# Patient Record
Sex: FEMALE | Race: ASIAN | Employment: FULL TIME | URBAN - METROPOLITAN AREA
[De-identification: names, ages, dates, MRNs, and addresses within clinical notes are randomized per-mention and may not be internally consistent; named-entity substitution may affect disease eponyms.]

---

## 2017-07-05 ENCOUNTER — HOSPITAL ENCOUNTER (OUTPATIENT)
Age: 37
Discharge: HOME | End: 2017-07-05
Attending: EMERGENCY MEDICINE
Payer: COMMERCIAL

## 2017-07-05 VITALS
HEART RATE: 85 BPM | SYSTOLIC BLOOD PRESSURE: 129 MMHG | OXYGEN SATURATION: 100 % | TEMPERATURE: 99 F | RESPIRATION RATE: 16 BRPM | DIASTOLIC BLOOD PRESSURE: 80 MMHG | WEIGHT: 170 LBS

## 2017-07-05 DIAGNOSIS — S39.012A BACK STRAIN, INITIAL ENCOUNTER: Primary | ICD-10-CM

## 2017-07-05 PROCEDURE — 99203 OFFICE O/P NEW LOW 30 MIN: CPT

## 2017-07-05 PROCEDURE — 99202 OFFICE O/P NEW SF 15 MIN: CPT

## 2017-07-05 RX ORDER — IBUPROFEN 600 MG/1
600 TABLET ORAL EVERY 6 HOURS PRN
Qty: 30 TABLET | Refills: 0 | Status: SHIPPED | OUTPATIENT
Start: 2017-07-05 | End: 2017-07-12

## 2017-07-05 RX ORDER — DIAZEPAM 5 MG/1
5 TABLET ORAL EVERY 6 HOURS PRN
Qty: 20 TABLET | Refills: 0 | Status: SHIPPED | OUTPATIENT
Start: 2017-07-05 | End: 2017-07-12

## 2017-07-05 NOTE — ED NOTES
Leaving for cruise in [facility] prescriptions given and reviewed avoid lifting pushing lifting. Not breast feeding. Follow up with cruise md if not better in 3 days. Or go to the ed. for new or worse concners

## 2017-07-06 NOTE — ED PROVIDER NOTES
Patient Seen in: [facility] [place]    History   No chief complaint on file. Stated Complaint: back pain    HPI    Several days of left low back pain after sitting on a bus for several hours and holding her two year old.  No r extremity strength 5/5. No sensory deficit. Patellar reflexes strong and equal.    Skin: Skin is warm and dry. Psychiatric: She has a normal mood and affect.  Her behavior is normal.            ED Course   Labs Reviewed - No data to display    ===========

## 2022-08-02 ENCOUNTER — HOSPITAL ENCOUNTER (OUTPATIENT)
Age: 42
Discharge: HOME | End: 2022-08-02
Payer: COMMERCIAL

## 2022-08-02 VITALS
BODY MASS INDEX: 27.6 KG/M2 | RESPIRATION RATE: 18 BRPM | SYSTOLIC BLOOD PRESSURE: 131 MMHG | OXYGEN SATURATION: 99 % | HEIGHT: 62 IN | WEIGHT: 150 LBS | HEART RATE: 79 BPM | TEMPERATURE: 98 F | DIASTOLIC BLOOD PRESSURE: 79 MMHG

## 2022-08-02 DIAGNOSIS — Z51.89 VISIT FOR WOUND CHECK: Primary | ICD-10-CM

## 2022-08-02 PROBLEM — O09.90 SUPERVISION OF HIGH-RISK PREGNANCY: Status: ACTIVE | Noted: 2019-02-08

## 2022-08-02 PROBLEM — O34.219 PREVIOUS CESAREAN DELIVERY, ANTEPARTUM CONDITION OR COMPLICATION (HCC): Status: ACTIVE | Noted: 2019-02-08

## 2022-08-02 PROBLEM — O24.119: Status: ACTIVE | Noted: 2019-02-08

## 2022-08-02 PROBLEM — O34.219 PREVIOUS CESAREAN DELIVERY, ANTEPARTUM CONDITION OR COMPLICATION: Status: ACTIVE | Noted: 2019-02-08

## 2022-08-02 PROBLEM — O40.3XX0 POLYHYDRAMNIOS IN THIRD TRIMESTER (HCC): Status: ACTIVE | Noted: 2019-07-19

## 2022-08-02 PROBLEM — O09.90 SUPERVISION OF HIGH-RISK PREGNANCY (HCC): Status: ACTIVE | Noted: 2019-02-08

## 2022-08-02 PROBLEM — O40.3XX0 POLYHYDRAMNIOS IN THIRD TRIMESTER: Status: ACTIVE | Noted: 2019-07-19

## 2022-08-02 PROBLEM — O24.119 PRE-EXISTING TYPE 2 DIABETES MELLITUS DURING PREGNANCY: Status: ACTIVE | Noted: 2019-02-08

## 2022-08-02 PROBLEM — IMO0001 IDDM (INSULIN DEPENDENT DIABETES MELLITUS): Status: ACTIVE | Noted: 2019-01-22

## 2022-08-02 PROBLEM — M75.42 IMPINGEMENT SYNDROME OF LEFT SHOULDER: Status: ACTIVE | Noted: 2020-04-28

## 2022-08-02 PROCEDURE — 99202 OFFICE O/P NEW SF 15 MIN: CPT | Performed by: NURSE PRACTITIONER

## 2022-08-02 RX ORDER — ATORVASTATIN CALCIUM 20 MG/1
20 TABLET, FILM COATED ORAL DAILY
COMMUNITY
Start: 2021-12-27 | End: 2022-12-27

## 2022-08-02 RX ORDER — DIAZEPAM 5 MG/1
1 TABLET ORAL NIGHTLY PRN
COMMUNITY
Start: 2020-04-28

## 2022-08-02 NOTE — ED INITIAL ASSESSMENT (HPI)
Pt presents to the IC with c/o left eyebrow laceration that happened last Wednesday in [facility] while swimming in pool. Bumped eyebrow + bruising, denies hitting head or LOC. Butterfly bandage in place. Pt denies any signs of infection.

## 2023-03-25 ENCOUNTER — HOSPITAL ENCOUNTER (OUTPATIENT)
Age: 43
Discharge: HOME | End: 2023-03-25
Payer: COMMERCIAL

## 2023-03-25 VITALS
OXYGEN SATURATION: 100 % | TEMPERATURE: 98 F | HEART RATE: 90 BPM | DIASTOLIC BLOOD PRESSURE: 80 MMHG | RESPIRATION RATE: 18 BRPM | SYSTOLIC BLOOD PRESSURE: 125 MMHG

## 2023-03-25 DIAGNOSIS — M53.80 BACK TIGHTNESS: Primary | ICD-10-CM

## 2023-03-25 PROCEDURE — 99285 EMERGENCY DEPT VISIT HI MDM: CPT

## 2023-03-25 PROCEDURE — 96374 THER/PROPH/DIAG INJ IV PUSH: CPT

## 2023-03-25 PROCEDURE — 93010 ELECTROCARDIOGRAM REPORT: CPT

## 2023-03-25 PROCEDURE — 93005 ELECTROCARDIOGRAM TRACING: CPT

## 2023-03-25 RX ORDER — DIAZEPAM 5 MG/1
5 TABLET ORAL EVERY 12 HOURS PRN
Qty: 10 TABLET | Refills: 0 | Status: SHIPPED | OUTPATIENT
Start: 2023-03-25

## 2023-03-25 RX ORDER — METHYLPREDNISOLONE 4 MG/1
TABLET ORAL
Qty: 1 EACH | Refills: 0 | Status: SHIPPED | OUTPATIENT
Start: 2023-03-25

## 2023-03-25 NOTE — DISCHARGE INSTRUCTIONS
Tylenol or ibuprofen as needed for pain. Valium for tightness. Continue wearing the patch. You may take the steroid for inflammation, it will raise your blood sugar. Apply heat. Gentle stretching. Follow-up with your primary doctor to discuss further options.   Consider massage, acupuncture, physical therapy, cupping, TENS stimulation

## 2023-03-26 ENCOUNTER — HOSPITAL ENCOUNTER (EMERGENCY)
Facility: HOSPITAL | Age: 43
Discharge: HOME | End: 2023-03-26
Attending: EMERGENCY MEDICINE
Payer: COMMERCIAL

## 2023-03-26 ENCOUNTER — APPOINTMENT (OUTPATIENT)
Dept: GENERAL RADIOLOGY | Facility: HOSPITAL | Age: 43
End: 2023-03-26
Attending: EMERGENCY MEDICINE
Payer: COMMERCIAL

## 2023-03-26 ENCOUNTER — APPOINTMENT (OUTPATIENT)
Dept: CT IMAGING | Facility: HOSPITAL | Age: 43
End: 2023-03-26
Attending: EMERGENCY MEDICINE
Payer: COMMERCIAL

## 2023-03-26 VITALS
HEART RATE: 83 BPM | RESPIRATION RATE: 16 BRPM | TEMPERATURE: 98 F | OXYGEN SATURATION: 98 % | SYSTOLIC BLOOD PRESSURE: 129 MMHG | HEIGHT: 62 IN | WEIGHT: 152 LBS | BODY MASS INDEX: 27.97 KG/M2 | DIASTOLIC BLOOD PRESSURE: 88 MMHG

## 2023-03-26 DIAGNOSIS — R10.13 ABDOMINAL PAIN, EPIGASTRIC: Primary | ICD-10-CM

## 2023-03-26 LAB
ALBUMIN SERPL-MCNC: 3.6 G/DL (ref 3.4–5)
ALP LIVER SERPL-CCNC: 67 U/L
ALT SERPL-CCNC: 18 U/L
ANION GAP SERPL CALC-SCNC: 7 MMOL/L (ref 0–18)
AST SERPL-CCNC: 10 U/L (ref 15–37)
ATRIAL RATE: 84 BPM
B-HCG UR QL: NEGATIVE
BASOPHILS # BLD AUTO: 0.04 X10(3) UL (ref 0–0.2)
BASOPHILS NFR BLD AUTO: 0.4 %
BILIRUB DIRECT SERPL-MCNC: 0.2 MG/DL (ref 0–0.2)
BILIRUB SERPL-MCNC: 0.5 MG/DL (ref 0.1–2)
BUN BLD-MCNC: 14 MG/DL (ref 7–18)
BUN/CREAT SERPL: 20.3 (ref 10–20)
CALCIUM BLD-MCNC: 9.5 MG/DL (ref 8.5–10.1)
CHLORIDE SERPL-SCNC: 106 MMOL/L (ref 98–112)
CO2 SERPL-SCNC: 27 MMOL/L (ref 21–32)
CREAT BLD-MCNC: 0.69 MG/DL
D DIMER PPP FEU-MCNC: <0.27 UG/ML FEU (ref ?–0.5)
DEPRECATED RDW RBC AUTO: 37.5 FL (ref 35.1–46.3)
EOSINOPHIL # BLD AUTO: 0.16 X10(3) UL (ref 0–0.7)
EOSINOPHIL NFR BLD AUTO: 1.5 %
ERYTHROCYTE [DISTWIDTH] IN BLOOD BY AUTOMATED COUNT: 12.4 % (ref 11–15)
GFR SERPLBLD BASED ON 1.73 SQ M-ARVRAT: 111 ML/MIN/1.73M2 (ref 60–?)
GLUCOSE BLD-MCNC: 130 MG/DL (ref 70–99)
HCT VFR BLD AUTO: 36.3 %
HGB BLD-MCNC: 12.2 G/DL
IMM GRANULOCYTES # BLD AUTO: 0.01 X10(3) UL (ref 0–1)
IMM GRANULOCYTES NFR BLD: 0.1 %
LIPASE SERPL-CCNC: 64 U/L (ref 13–75)
LYMPHOCYTES # BLD AUTO: 3.07 X10(3) UL (ref 1–4)
LYMPHOCYTES NFR BLD AUTO: 29 %
MCH RBC QN AUTO: 27.8 PG (ref 26–34)
MCHC RBC AUTO-ENTMCNC: 33.6 G/DL (ref 31–37)
MCV RBC AUTO: 82.7 FL
MONOCYTES # BLD AUTO: 0.66 X10(3) UL (ref 0.1–1)
MONOCYTES NFR BLD AUTO: 6.2 %
NEUTROPHILS # BLD AUTO: 6.66 X10 (3) UL (ref 1.5–7.7)
NEUTROPHILS # BLD AUTO: 6.66 X10(3) UL (ref 1.5–7.7)
NEUTROPHILS NFR BLD AUTO: 62.8 %
OSMOLALITY SERPL CALC.SUM OF ELEC: 292 MOSM/KG (ref 275–295)
P AXIS: 64 DEGREES
P-R INTERVAL: 174 MS
PLATELET # BLD AUTO: 390 10(3)UL (ref 150–450)
POTASSIUM SERPL-SCNC: 3.9 MMOL/L (ref 3.5–5.1)
PROT SERPL-MCNC: 7.6 G/DL (ref 6.4–8.2)
Q-T INTERVAL: 356 MS
QRS DURATION: 86 MS
QTC CALCULATION (BEZET): 420 MS
R AXIS: 11 DEGREES
RBC # BLD AUTO: 4.39 X10(6)UL
SARS-COV-2 RNA RESP QL NAA+PROBE: NOT DETECTED
SODIUM SERPL-SCNC: 140 MMOL/L (ref 136–145)
T AXIS: 35 DEGREES
VENTRICULAR RATE: 84 BPM
WBC # BLD AUTO: 10.6 X10(3) UL (ref 4–11)

## 2023-03-26 PROCEDURE — 85379 FIBRIN DEGRADATION QUANT: CPT | Performed by: EMERGENCY MEDICINE

## 2023-03-26 PROCEDURE — 74177 CT ABD & PELVIS W/CONTRAST: CPT | Performed by: EMERGENCY MEDICINE

## 2023-03-26 PROCEDURE — 83690 ASSAY OF LIPASE: CPT | Performed by: EMERGENCY MEDICINE

## 2023-03-26 PROCEDURE — 80048 BASIC METABOLIC PNL TOTAL CA: CPT | Performed by: EMERGENCY MEDICINE

## 2023-03-26 PROCEDURE — 80076 HEPATIC FUNCTION PANEL: CPT | Performed by: EMERGENCY MEDICINE

## 2023-03-26 PROCEDURE — 81025 URINE PREGNANCY TEST: CPT

## 2023-03-26 PROCEDURE — 71045 X-RAY EXAM CHEST 1 VIEW: CPT | Performed by: EMERGENCY MEDICINE

## 2023-03-26 PROCEDURE — 85025 COMPLETE CBC W/AUTO DIFF WBC: CPT | Performed by: EMERGENCY MEDICINE

## 2023-03-26 RX ORDER — KETOROLAC TROMETHAMINE 15 MG/ML
15 INJECTION, SOLUTION INTRAMUSCULAR; INTRAVENOUS ONCE
Status: COMPLETED | OUTPATIENT
Start: 2023-03-26 | End: 2023-03-26

## 2023-03-26 NOTE — ED INITIAL ASSESSMENT (HPI)
Pt AOx4 C/C back pain and dyspnea on exertion, seen at  today and was Rx a muscle relaxer that seemed to worsen her complaint. Patient states she can create a chest pain if she takes a deep breath.

## 2023-10-21 ENCOUNTER — APPOINTMENT (OUTPATIENT)
Dept: MRI IMAGING | Facility: HOSPITAL | Age: 43
End: 2023-10-21
Attending: EMERGENCY MEDICINE
Payer: COMMERCIAL

## 2023-10-21 ENCOUNTER — HOSPITAL ENCOUNTER (EMERGENCY)
Facility: HOSPITAL | Age: 43
Discharge: HOME | End: 2023-10-21
Attending: EMERGENCY MEDICINE
Payer: COMMERCIAL

## 2023-10-21 VITALS
SYSTOLIC BLOOD PRESSURE: 116 MMHG | RESPIRATION RATE: 26 BRPM | BODY MASS INDEX: 28.32 KG/M2 | HEIGHT: 61 IN | OXYGEN SATURATION: 98 % | WEIGHT: 150 LBS | TEMPERATURE: 97 F | DIASTOLIC BLOOD PRESSURE: 74 MMHG | HEART RATE: 92 BPM

## 2023-10-21 DIAGNOSIS — M79.602 LEFT ARM PAIN: ICD-10-CM

## 2023-10-21 DIAGNOSIS — M50.20 CERVICAL DISC HERNIATION: Primary | ICD-10-CM

## 2023-10-21 LAB
ANION GAP SERPL CALC-SCNC: 12 MMOL/L (ref 0–18)
BASOPHILS # BLD AUTO: 0.02 X10(3) UL (ref 0–0.2)
BASOPHILS NFR BLD AUTO: 0.1 %
BUN BLD-MCNC: 19 MG/DL (ref 7–18)
BUN/CREAT SERPL: 17.8 (ref 10–20)
CALCIUM BLD-MCNC: 10.6 MG/DL (ref 8.5–10.1)
CHLORIDE SERPL-SCNC: 102 MMOL/L (ref 98–112)
CO2 SERPL-SCNC: 25 MMOL/L (ref 21–32)
CREAT BLD-MCNC: 1.07 MG/DL
DEPRECATED RDW RBC AUTO: 35.6 FL (ref 35.1–46.3)
EGFRCR SERPLBLD CKD-EPI 2021: 66 ML/MIN/1.73M2 (ref 60–?)
EOSINOPHIL # BLD AUTO: 0.02 X10(3) UL (ref 0–0.7)
EOSINOPHIL NFR BLD AUTO: 0.1 %
ERYTHROCYTE [DISTWIDTH] IN BLOOD BY AUTOMATED COUNT: 12.2 % (ref 11–15)
GLUCOSE BLD-MCNC: 331 MG/DL (ref 70–99)
HCT VFR BLD AUTO: 41.6 %
HGB BLD-MCNC: 13.9 G/DL
IMM GRANULOCYTES # BLD AUTO: 0.05 X10(3) UL (ref 0–1)
IMM GRANULOCYTES NFR BLD: 0.3 %
LYMPHOCYTES # BLD AUTO: 1.97 X10(3) UL (ref 1–4)
LYMPHOCYTES NFR BLD AUTO: 13.1 %
MAGNESIUM SERPL-MCNC: 2 MG/DL (ref 1.6–2.6)
MCH RBC QN AUTO: 26.9 PG (ref 26–34)
MCHC RBC AUTO-ENTMCNC: 33.4 G/DL (ref 31–37)
MCV RBC AUTO: 80.5 FL
MONOCYTES # BLD AUTO: 0.81 X10(3) UL (ref 0.1–1)
MONOCYTES NFR BLD AUTO: 5.4 %
NEUTROPHILS # BLD AUTO: 12.12 X10 (3) UL (ref 1.5–7.7)
NEUTROPHILS # BLD AUTO: 12.12 X10(3) UL (ref 1.5–7.7)
NEUTROPHILS NFR BLD AUTO: 81 %
OSMOLALITY SERPL CALC.SUM OF ELEC: 303 MOSM/KG (ref 275–295)
PLATELET # BLD AUTO: 469 10(3)UL (ref 150–450)
POTASSIUM SERPL-SCNC: 4.7 MMOL/L (ref 3.5–5.1)
RBC # BLD AUTO: 5.17 X10(6)UL
SODIUM SERPL-SCNC: 139 MMOL/L (ref 136–145)
WBC # BLD AUTO: 15 X10(3) UL (ref 4–11)

## 2023-10-21 PROCEDURE — 72141 MRI NECK SPINE W/O DYE: CPT | Performed by: EMERGENCY MEDICINE

## 2023-10-21 PROCEDURE — 80048 BASIC METABOLIC PNL TOTAL CA: CPT | Performed by: EMERGENCY MEDICINE

## 2023-10-21 PROCEDURE — 99285 EMERGENCY DEPT VISIT HI MDM: CPT

## 2023-10-21 PROCEDURE — 96374 THER/PROPH/DIAG INJ IV PUSH: CPT

## 2023-10-21 PROCEDURE — 93010 ELECTROCARDIOGRAM REPORT: CPT

## 2023-10-21 PROCEDURE — 85025 COMPLETE CBC W/AUTO DIFF WBC: CPT | Performed by: EMERGENCY MEDICINE

## 2023-10-21 PROCEDURE — 93005 ELECTROCARDIOGRAM TRACING: CPT

## 2023-10-21 PROCEDURE — 83735 ASSAY OF MAGNESIUM: CPT | Performed by: EMERGENCY MEDICINE

## 2023-10-21 PROCEDURE — 96375 TX/PRO/DX INJ NEW DRUG ADDON: CPT

## 2023-10-21 RX ORDER — CYCLOBENZAPRINE HCL 10 MG
10 TABLET ORAL 3 TIMES DAILY PRN
Qty: 20 TABLET | Refills: 0 | Status: SHIPPED | OUTPATIENT
Start: 2023-10-21 | End: 2023-10-28

## 2023-10-21 RX ORDER — CYCLOBENZAPRINE HCL 10 MG
10 TABLET ORAL ONCE
Status: COMPLETED | OUTPATIENT
Start: 2023-10-21 | End: 2023-10-21

## 2023-10-21 RX ORDER — ORPHENADRINE CITRATE 30 MG/ML
30 INJECTION INTRAMUSCULAR; INTRAVENOUS ONCE
Status: COMPLETED | OUTPATIENT
Start: 2023-10-21 | End: 2023-10-21

## 2023-10-21 RX ORDER — MORPHINE SULFATE 4 MG/ML
4 INJECTION, SOLUTION INTRAMUSCULAR; INTRAVENOUS ONCE
Status: COMPLETED | OUTPATIENT
Start: 2023-10-21 | End: 2023-10-21

## 2023-10-21 RX ORDER — KETOROLAC TROMETHAMINE 15 MG/ML
15 INJECTION, SOLUTION INTRAMUSCULAR; INTRAVENOUS ONCE
Status: COMPLETED | OUTPATIENT
Start: 2023-10-21 | End: 2023-10-21

## 2023-10-21 RX ORDER — IBUPROFEN 600 MG/1
600 TABLET ORAL EVERY 8 HOURS PRN
Qty: 30 TABLET | Refills: 0 | Status: SHIPPED | OUTPATIENT
Start: 2023-10-21 | End: 2023-10-28

## 2023-10-21 RX ORDER — MIDAZOLAM HYDROCHLORIDE 1 MG/ML
2 INJECTION INTRAMUSCULAR; INTRAVENOUS
Status: DISCONTINUED | OUTPATIENT
Start: 2023-10-21 | End: 2023-10-21

## 2023-10-21 NOTE — ED INITIAL ASSESSMENT (HPI)
Patient ambulatory to ED for \"shooting neck pain\" for one week with difficulty swallowing, inability to move neck or lie flat. Pain radiates down back to left shoulder. Patient states this has happened prior, diagnosed with possible degenerative disk disease.

## 2023-10-22 LAB
ATRIAL RATE: 120 BPM
P AXIS: 59 DEGREES
P-R INTERVAL: 140 MS
Q-T INTERVAL: 308 MS
QRS DURATION: 68 MS
QTC CALCULATION (BEZET): 435 MS
R AXIS: 15 DEGREES
T AXIS: 57 DEGREES
VENTRICULAR RATE: 120 BPM

## 2024-04-10 ENCOUNTER — HOSPITAL ENCOUNTER (OUTPATIENT)
Age: 44
Discharge: HOME | End: 2024-04-10
Payer: COMMERCIAL

## 2024-04-10 VITALS
SYSTOLIC BLOOD PRESSURE: 132 MMHG | TEMPERATURE: 98 F | RESPIRATION RATE: 16 BRPM | DIASTOLIC BLOOD PRESSURE: 84 MMHG | OXYGEN SATURATION: 98 % | HEART RATE: 104 BPM

## 2024-04-10 DIAGNOSIS — B09 VIRAL EXANTHEM: ICD-10-CM

## 2024-04-10 DIAGNOSIS — J02.9 ACUTE VIRAL PHARYNGITIS: Primary | ICD-10-CM

## 2024-04-10 LAB — S PYO AG THROAT QL: NEGATIVE

## 2024-04-10 PROCEDURE — 87880 STREP A ASSAY W/OPTIC: CPT | Performed by: PHYSICIAN ASSISTANT

## 2024-04-10 PROCEDURE — 99213 OFFICE O/P EST LOW 20 MIN: CPT | Performed by: PHYSICIAN ASSISTANT

## 2024-04-10 RX ORDER — DULAGLUTIDE 0.75 MG/.5ML
0.75 INJECTION, SOLUTION SUBCUTANEOUS
COMMUNITY
Start: 2024-03-19

## 2024-04-10 RX ORDER — ATORVASTATIN CALCIUM 20 MG/1
20 TABLET, FILM COATED ORAL DAILY
COMMUNITY
Start: 2024-03-19

## 2024-04-10 NOTE — ED INITIAL ASSESSMENT (HPI)
Sore throat, bilateral ear pain and pain with swallowing for 4 days.  Pt noticed a rash on the inner left knee today.  +itchiness.

## 2024-04-10 NOTE — ED PROVIDER NOTES
Patient Seen in: [facility] [place]      History     Chief Complaint   Patient presents with    Sore Throat     Stated Complaint: Sore throat;ear pain    Subjective:   HPI    Patient is a 43-year-old female with past medical history of diabetes, hyperlipidemia that presents to immediate care due to sore throat x 4 days.  Associated symptoms include otalgia, sinus congestion.  Denies cough, fever.  Has been taking Tylenol at night with mild relief.  Patient also notes that she developed a pruritic rash on her right thigh that she noticed this morning.  She denies pain, recent medication changes, environmental exposures.    Objective:   Past Medical History:    Diabetes (HCC)    Hyperlipidemia              History reviewed. No pertinent surgical history.             Social History     Socioeconomic History    Marital status:    Tobacco Use    Smoking status: Never    Smokeless tobacco: Never   Vaping Use    Vaping status: Never Used   Substance and Sexual Activity    Alcohol use: Never    Drug use: Never              Review of Systems    Positive for stated complaint: Sore throat;ear pain  Other systems are as noted in HPI.  Constitutional and vital signs reviewed.      All other systems reviewed and negative except as noted above.    Physical Exam     ED Triage Vitals [04/10/24 1425]   /84   Pulse 104   Resp 16   Temp 97.9 °F (36.6 °C)   Temp src Temporal   SpO2 98 %   O2 Device None (Room air)       Current:/84   Pulse 104   Temp 97.9 °F (36.6 °C) (Temporal)   Resp 16   LMP 03/27/2024 (Approximate)   SpO2 98%         Physical Exam    Vital signs reviewed. Nursing note reviewed.  Constitutional: Well-developed. Well-nourished. In no acute distress  HENT: Mucous membranes moist. TMs intact bilaterally. No trismus. Uvula midline. Mild posterior pharynx erythema.  No petechiae, exudates, or posterior pharynx edema.  EYES: No scleral icterus or conjunctival injection.  NECK: Full ROM.  Supple.   CARDIAC: Normal rate. Normal S1/ S2. 2+ distal pulses. No edema  PULM/CHEST: Clear to auscultation bilaterally. No wheezes  Extremities: Full ROM  NEURO: Awake, alert, following commands, moving extremities, answering questions.   SKIN: Warm and dry.  Erythematous maculopapular, vesicular rash along the right inner thigh.  No tenderness fluctuation, discharge or bleeding  PSYCH: Normal judgment. Normal affect.       ED Course     Labs Reviewed   POCT RAPID STREP - Normal                      MDM      Patient is a 43-year-old female with diabetes, hyperlipidemia that presents to immediate care due to sore throat and congestion x 4 days with rash that developed this morning.  Patient arrives with stable vitals.  Physical exam showing mild posterior pharynx erythema.  Most likely viral pharyngitis, URI, viral illness, less likely strep pharyngitis as patient had rapid negative test today.  Less likely PTA or retropharyngeal abscess.  Additionally rash looking vesicular, erythematous nature most likely contact dermatitis versus viral exanthem versus herpes zoster.  Offered treatment for potential herpes zoster with Valtrex however patient declined at this time stating that it is mildly pruritic and will monitor symptoms.  Will return with new or worsening symptoms.  History given by patient.  Encouraged over-the-counter Tylenol ibuprofen for pain, decongestant such as Sudafed and antihistamine such as Claritin or Zyrtec to help with sinus congestion.                                   Medical Decision Making      Disposition and Plan     Clinical Impression:  1. Acute viral pharyngitis    2. Viral exanthem         Disposition:  Discharge  4/10/2024  2:46 pm    Follow-up:  Kelsey Dumas  [address] [facility] [id number] [phone]    Call             Medications Prescribed:  Discharge Medication List as of 4/10/2024  2:50 PM

## 2025-02-10 ENCOUNTER — LAB ENCOUNTER (OUTPATIENT)
Dept: LAB | Age: 45
End: 2025-02-10
Attending: NURSE PRACTITIONER
Payer: COMMERCIAL

## 2025-02-10 ENCOUNTER — OFFICE VISIT (OUTPATIENT)
Dept: ENDOCRINOLOGY CLINIC | Facility: CLINIC | Age: 45
End: 2025-02-10
Payer: COMMERCIAL

## 2025-02-10 VITALS
HEIGHT: 61 IN | DIASTOLIC BLOOD PRESSURE: 74 MMHG | WEIGHT: 154 LBS | HEART RATE: 92 BPM | BODY MASS INDEX: 29.07 KG/M2 | SYSTOLIC BLOOD PRESSURE: 122 MMHG

## 2025-02-10 DIAGNOSIS — E11.65 UNCONTROLLED TYPE 2 DIABETES MELLITUS WITH HYPERGLYCEMIA (HCC): Primary | ICD-10-CM

## 2025-02-10 DIAGNOSIS — E11.65 UNCONTROLLED TYPE 2 DIABETES MELLITUS WITH HYPERGLYCEMIA (HCC): ICD-10-CM

## 2025-02-10 LAB
AMB EXT GMI: 6.5 %
CREAT UR-SCNC: 128 MG/DL
GLUCOSE BLOOD: 128
HEMOGLOBIN A1C: 7.8 % (ref 4.3–5.6)
MICROALBUMIN UR-MCNC: 7.8 MG/DL
MICROALBUMIN/CREAT 24H UR-RTO: 60.9 UG/MG (ref ?–30)
TEST STRIP LOT #: NORMAL NUMERIC
VIT D+METAB SERPL-MCNC: 15.8 NG/ML (ref 30–100)

## 2025-02-10 PROCEDURE — 36415 COLL VENOUS BLD VENIPUNCTURE: CPT

## 2025-02-10 PROCEDURE — 82306 VITAMIN D 25 HYDROXY: CPT

## 2025-02-10 PROCEDURE — 82570 ASSAY OF URINE CREATININE: CPT

## 2025-02-10 PROCEDURE — 82043 UR ALBUMIN QUANTITATIVE: CPT

## 2025-02-10 RX ORDER — DULAGLUTIDE 1.5 MG/.5ML
1.5 INJECTION, SOLUTION SUBCUTANEOUS WEEKLY
Qty: 6 ML | Refills: 0 | Status: SHIPPED | OUTPATIENT
Start: 2025-02-10

## 2025-02-10 RX ORDER — HYDROCHLOROTHIAZIDE 12.5 MG/1
1 CAPSULE ORAL
Qty: 6 EACH | Refills: 0 | Status: SHIPPED | OUTPATIENT
Start: 2025-02-10

## 2025-02-10 NOTE — PROGRESS NOTES
Name: Sujatha Workman  Date: 2/10/2025    Referring Physician: No ref. provider found    CHIEF COMPLAINT   Chief Complaint   Patient presents with    Consult     HISTORY OF PRESENT ILLNESS   Sujatha Workman is a 44 year old female who presents for new consultation on diabetes management.   HbA1C: 7.8% at POC today.  Decreased from 10.3% on 12/20/2024.   Blood glucose is: 128 in clinic today.     FAMILY HISTORY OF DIABETES  - none   DIABETES HISTORY  Diagnosed: 2009  Prior HbA, C or glycohemoglobin were 10.3% 12/20/2024; 7.8% at POC today;     Patient has not had hospitalizations for blood sugar issues.   Denies any history of pancreatitis.     PREVIOUS MEDICATION FOR DM:  - insulin therapy during pregnancy     CURRENT MEDICATIONS FOR DM:  Trulicity 1.5mg once weekly --has taken 4 doses so far; having occasional upset stomach, but this is tolerable   Metformin 500mg twice daily     HOME GLUCOSE READINGS:   Freestyle perfecto 3 CGM download reviewed with patient. The results revealed (1/14 to 2/10):     Average glucose: 133mg/dl     In target range: (70-180mg/dl) :91%     High glucose targets: (> 180mg/dl): 8%     Very high glucose targets: (> 250mg/dl): 1%     Low glucose targets: (less than 70mg/dl): 0%     Very Low glucose targets: (< 54mg/dl ): 0%     Glucose Management Indicator (GMI): 6.5%  Glucose Variability: 27.1%    Continuous Glucose Monitoring Interpretation    Sujatha Workman has undergone continuous glucose monitoring with the personal Freestyle perfecto 3 continuous glucose monitor.  The blood glucose tracings were evaluated for two weeks prior to office visit.  Her blood glucose tracings demonstrated overall well controlled glucose pattern that were mostly within target range. She did experience brief hypoglycemia during the week of evaluation mostly occurring overnight.       HISTORY OF DIABETES COMPLICATIONS:  History of Retinopathy: denies  - last eye exam within the last 12 months: yes - 2/2024 - [facility] Betty Celaya   History of Neuropathy: denies   History of Nephropathy: denies     ASSOCIATED COMPLICATIONS:   HTN: denies   Hyperlipidemia: yes  Cardiovascular Disease: denies   Peripheral Vascular Disease: denies     DIETARY COMPLIANCE:  Breakfast: egg with breakfast  Lunch: tuna with salad   Dinner: banana or a small piece of fruit     - carbonated water only   - denies drinking fruit juice or regular soda   - occ eating sweets    EXERCISE:   No - staying active with kids    Twice weekly in the office    Polyuria, polyphagia, polydipsia: denies   Paresthesias: denies   Blurred vision: denies   Recent steroids, illness or infections: denies     REVIEW OF SYSTEMS  Constitutional: Negative for: weight change, fever, fatigue, cold/heat intolerance  Eyes: Negative for:  Visual changes, proptosis, blurring  ENT: Negative for:  dysphagia, neck swelling, dysphonia  Respiratory: Negative for: hemoptysis, shortness of breath, cough, or dyspnea.  Cardiovascular: Negative for:  chest pain, chest discomfort, palpitations  GI: Negative for:  abdominal pain, nausea, vomiting, diarrhea, heartburn, constipation  Neurology: Negative for: headache, dizziness, syncope, numbness/tingling, or weakness.   Genito-Urinary: Negative for: dysuria, frequency or hematuria   Hematology/Lymphatics: Negative for: bruising, easy bleeding, lower extremity edema  Skin: Negative for: rash, blister, infection or ulcers.  Endocrine: Negative for: polyuria, polydipsia. no osteoporosis. no thyroid disease.     MEDICATIONS:     Current Outpatient Medications:     TRULICITY 0.75 MG/0.5ML Subcutaneous Solution Pen-injector, Inject 0.75 mg into the skin every 7 days., Disp: , Rfl:     metFORMIN 500 MG Oral Tab, Take 1 tablet (500 mg total) by mouth 2 (two) times daily with meals., Disp: , Rfl:     atorvastatin 20 MG Oral Tab, Take 1 tablet (20 mg total) by mouth daily., Disp: , Rfl:     methylPREDNISolone (MEDROL) 4 MG Oral Tablet Therapy  Pack, Dosepack: take as directed (Patient not taking: Reported on 4/10/2024), Disp: 1 each, Rfl: 0    diazePAM 5 MG Oral Tab, Take 1 tablet (5 mg total) by mouth every 12 (twelve) hours as needed (spasms). (Patient not taking: Reported on 4/10/2024), Disp: 10 tablet, Rfl: 0    diazePAM 5 MG Oral Tab, Take 1 tablet by mouth nightly as needed. (Patient not taking: Reported on 4/10/2024), Disp: , Rfl:     ALLERGIES:   Allergies[1]    SOCIAL HISTORY:   Social History     Socioeconomic History    Marital status:    Tobacco Use    Smoking status: Never    Smokeless tobacco: Never   Vaping Use    Vaping status: Never Used   Substance and Sexual Activity    Alcohol use: Never    Drug use: Never       PAST MEDICAL HISTORY:   Past Medical History:    Diabetes (HCC)    Hyperlipidemia       PAST SURGICAL HISTORY:   History reviewed. No pertinent surgical history.    PHYSICAL EXAM:   Vitals:    02/10/25 1049   BP: 122/74   Pulse: 92   Weight: 154 lb (69.9 kg)   Height: 5' 1\" (1.549 m)     BMI:   Body mass index is 29.1 kg/m².    General Appearance:  alert, well developed, in no acute distress  Nutritional:  no extreme weight gain or loss  Head: Atraumatic  Eyes:  normal conjunctivae, sclera., normal sclera and normal pupils  Throat/Neck: normal sound to voice. Normal hearing, normal speech  Back: no kyphosis  Respiratory:  Speaking in full sentences, non-labored. no increased work of breathing, no audible wheezing    Skin:  normal moisture and skin texture, no visible lesions  Hair and nails: normal scalp hair  Hematologic:  no excessive bruising  Neuro: motor grossly intact, moving all extremities without difficulty  Psychiatric:  oriented to time, self, and place  Extremities: no obvious extremity swelling, no lesions    Diabetes Foot Exam:  Bilateral barefoot skin diabetic exam is normal, visualized feet and the appearance is normal.  Bilateral monofilament/sensation of both feet is normal.  Pulsation pedal pulse exam  of both lower legs/feet is normal as well.    LABS: Pertinent labs reviewed    ASSESSMENT/PLAN:    -Reviewed with patient the pathogenesis of diabetes, clinical significance of A1c, and common complications such as: microvascular, macrovascular and diabetic ketoacidosis. Patient verbalizes understanding of the importance of glycemic control and the goals of therapy.   -Discussed with patient glucose targets ranges (Fasting  and post prandial <180).     1.Type 2 Diabetes Mellitus, uncontrolled  -LAB DATA  HbA1C: 7.8% today   a) Medications  - continue with Trulicity 1.5mg once weekly   - continue with Metformin   500mg before breakfast   500mg before dinner     -discussed to start limiting carbs to 30-45gm per meal/ max 135gm per day.   - discussed to include protein with all meals.   - low carb handouts were given to patient today.   - increase water intake to 60-80oz of water per day   -discussed to avoid eating snacks between meals.   -discussed to eliminate added sugars to diet   -instructed to eat dinner at least 2hrs before bedtime.   -instructed to start exercising at least 2-3x weekly   -reviewed target goal BG readings and A1C  -reviewed when to call and notify me of abnormal BG readings.     b) Nephropathy: GFR: >90 on 2024 and urine MA: 88.7 on 2024 --> repeat urine MA  c) UTD with optho   d) Foot exam: normal today 2/10/2025  e) cont. Using freestyle perfecto 3 cgm  f) Life style changes reviewed     2. Blood Pressure Management   - normotensive today     3.Hyperlipidemia   - LDL:71 and Tri on 2023  - on atorvastatin 20mg nightly       RTC in 3 months   Patient instructed to call sooner if they develop Blood glucose readings <75 and/or if they have readings persistently >200.     The risks and benefits of my recommendations, as well as other treatment options were discussed with the patient today. questions were also answered to the best of my knowledge. Patient verbalizes  understanding of these issues and agrees to the plan.    2/10/2025  OSVALDO Hawkins       [1] No Known Allergies

## 2025-02-10 NOTE — PROGRESS NOTES
Name: Sujatha Workman  YOB: 1980  Report Period: 01/14/2025 - 02/10/2025 (28 days)  Generated: 02/10/2025  Time CGM Active: 95%      Glucose Statistics and Targets  Average Glucose: 133 mg/dL  Glucose Management Indicator (GMI): 6.5%  Glucose Variability (%CV): 27.1%  Target Range: 70 - 180 mg/dL      Time in Ranges  Very High: >250 mg/dL --- 1%  High: 181 - 250 mg/dL --- 8%  Target Range: 70 - 180 mg/dL --- 91%  Low: 54 - 69 mg/dL --- 0%  Very Low: <54 mg/dL --- 0%

## 2025-02-10 NOTE — PATIENT INSTRUCTIONS
A1C: 7.8% today -->decreased from 10.3% on 12/20/2024  Blood glucose: 128 in clinic today    Medications:   - continue with Trulicity 1.5mg once weekly   - continue with Metformin   500mg before breakfast   500mg before dinner       Let's start to eat meals in the following order:   - veggies first (preferably high in fiber)   - protein second (preferably lean - skinless chicken/fish/turkey)   - carbohydrates last (preferably complex- whole wheat products)    - increase water intake to 60-80oz per day   - increase exercise 2-3 days per week   -check labs today     Weight:  Wt Readings from Last 6 Encounters:   02/10/25 154 lb (69.9 kg)   10/21/23 150 lb (68 kg)   03/25/23 152 lb (68.9 kg)   08/02/22 150 lb (68 kg)   07/05/17 170 lb (77.1 kg)     A1C goal:  <7.0% (preferably <6.5%)    Blood sugar testing:  Continue using Freestyle perfecto 3 continuous glucometer     Blood sugar targets:  Before breakfast:  (preferably < 110)  2 hours after meals: <150     Call for persistent blood sugars < 75 or > 200  
General Sunscreen Counseling: I recommended a broad spectrum sunscreen with a SPF of 30 or higher.  I explained that SPF 30 sunscreens block approximately 97 percent of the sun's harmful rays.  Sunscreens should be applied at least 15 minutes prior to expected sun exposure and then every 2 hours after that as long as sun exposure continues. If swimming or exercising sunscreen should be reapplied every 45 minutes to an hour after getting wet or sweating.  One ounce, or the equivalent of a shot glass full of sunscreen, is adequate to protect the skin not covered by a bathing suit. I also recommended a lip balm with a sunscreen as well. Sun protective clothing can be used in lieu of sunscreen but must be worn the entire time you are exposed to the sun's rays.
Products Recommended: Vanicream
Detail Level: Detailed

## 2025-02-11 DIAGNOSIS — E55.9 VITAMIN D DEFICIENCY: Primary | ICD-10-CM

## 2025-02-11 RX ORDER — ERGOCALCIFEROL 1.25 MG/1
50000 CAPSULE ORAL WEEKLY
Qty: 12 CAPSULE | Refills: 0 | Status: SHIPPED | OUTPATIENT
Start: 2025-02-11

## 2025-04-07 RX ORDER — DULAGLUTIDE 1.5 MG/.5ML
1.5 INJECTION, SOLUTION SUBCUTANEOUS
Qty: 6 ML | Refills: 0 | Status: SHIPPED | OUTPATIENT
Start: 2025-04-07

## 2025-04-07 NOTE — TELEPHONE ENCOUNTER
Endocrine Refill protocol for oral and injectable diabetic medications    Protocol Criteria:  PASSED  Reason: N/A    If all below requirements are met, send a 90-day supply with 1 refill per provider protocol.    Verify appointment with Endocrinology completed in the last 6 months or scheduled in the next 3 months.  Verify A1C has been completed within the last 6 months and is below 8.5%     Last completed office visit: 2/10/2025 Salazar Reinoso APRN   Next scheduled Follow up:   Future Appointments   Date Time Provider Department Center   5/12/2025 11:30 AM Salazar Reinoso APRN [facility]      Last A1c result: Last A1c value was 7.8% done 2/10/2025.

## 2025-05-12 ENCOUNTER — OFFICE VISIT (OUTPATIENT)
Dept: ENDOCRINOLOGY CLINIC | Facility: CLINIC | Age: 45
End: 2025-05-12

## 2025-05-12 VITALS
RESPIRATION RATE: 18 BRPM | HEART RATE: 89 BPM | BODY MASS INDEX: 28.32 KG/M2 | HEIGHT: 61 IN | SYSTOLIC BLOOD PRESSURE: 110 MMHG | DIASTOLIC BLOOD PRESSURE: 74 MMHG | WEIGHT: 150 LBS

## 2025-05-12 DIAGNOSIS — E11.65 UNCONTROLLED TYPE 2 DIABETES MELLITUS WITH HYPERGLYCEMIA (HCC): Primary | ICD-10-CM

## 2025-05-12 DIAGNOSIS — E55.9 VITAMIN D DEFICIENCY: ICD-10-CM

## 2025-05-12 LAB
GLUCOSE BLOOD: 235
HEMOGLOBIN A1C: 6.5 % (ref 4.3–5.6)
TEST STRIP LOT #: NORMAL NUMERIC

## 2025-05-12 RX ORDER — DULAGLUTIDE 3 MG/.5ML
3 INJECTION, SOLUTION SUBCUTANEOUS WEEKLY
Qty: 6 ML | Refills: 0 | Status: SHIPPED | OUTPATIENT
Start: 2025-05-12

## 2025-05-12 NOTE — PROGRESS NOTES
Name: Sujatha Workman  YOB: 1980  Report Period: 04/15/2025 - 05/12/2025 (28 days)  Generated: 05/12/2025  Time CGM Active: 93%      Glucose Statistics and Targets  Average Glucose: 140 mg/dL  Glucose Management Indicator (GMI): 6.7%  Glucose Variability (%CV): 29.6%  Target Range: 70 - 180 mg/dL      Time in Ranges  Very High: >250 mg/dL --- 1%  High: 181 - 250 mg/dL --- 15%  Target Range: 70 - 180 mg/dL --- 84%  Low: 54 - 69 mg/dL --- 0%  Very Low: <54 mg/dL --- 0%

## 2025-05-12 NOTE — PATIENT INSTRUCTIONS
A1C: 6.5% today --> decreased from 7.8% on 2/10/2025  Blood glucose: 235 in clinic today    Medications:   - increase Trulicity 1.5 --> 3mg once weekly   - stop Metformin   - may take 1,000mg as needed with a higher carb meal     - start Vitamin D3 2,000 units daily in the morning     - continue to follow a low carb diet  - continue to stay active/ increase exercise   - repeat labs before next follow up visit - no fasting needed    --> make sure you are well hydrated prior       Weight:  Wt Readings from Last 6 Encounters:   05/12/25 150 lb (68 kg)   02/10/25 154 lb (69.9 kg)   10/21/23 150 lb (68 kg)   03/25/23 152 lb (68.9 kg)   08/02/22 150 lb (68 kg)   07/05/17 170 lb (77.1 kg)     A1C goal:  <7.0% (preferably <6.5%)    Blood sugar testing:  Continue using Freestyle perfecto 3 plus continuous glucometer     Blood sugar targets:  Before breakfast:  (preferably < 110)  Before meals: <150   2 hours after meals: <180 (preferably <150)     Call for persistent blood sugars < 75 or > 200

## 2025-05-12 NOTE — PROGRESS NOTES
Name: Sujatha Workman  Date: 5/12/2025    CHIEF COMPLAINT   Chief Complaint   Patient presents with    Diabetes     Follow-Up     HISTORY OF PRESENT ILLNESS   Sujatha Workman is a 44 year old female who presents for follow up on diabetes management.   HbA1C: 6.5% at POC today. Previously was 7.8% on 2/10/2025.   Blood glucose is: 235 in clinic today.     FAMILY HISTORY OF DIABETES  - none   DIABETES HISTORY  Diagnosed: 2009  Prior HbA, C or glycohemoglobin were 10.3% 12/20/2024; 7.8% 2/10/2025; 6.5% at POC today;     Patient has not had hospitalizations for blood sugar issues.   Denies any history of pancreatitis.     PREVIOUS MEDICATION FOR DM:  - insulin therapy during pregnancy     CURRENT MEDICATIONS FOR DM:  Trulicity 1.5mg once weekly --tolerating well; denies any GI s/e   Metformin 1,000mg twice daily    - holding dose if eating a low carb meal     HOME GLUCOSE READINGS:   Freestyle perfecto 3 plus CGM download reviewed with patient.     Report Period: 04/15/2025 - 05/12/2025 (28 days)  Generated: 05/12/2025  Time CGM Active: 93%        Glucose Statistics and Targets  Average Glucose: 140 mg/dL  Glucose Management Indicator (GMI): 6.7%  Glucose Variability (%CV): 29.6%  Target Range: 70 - 180 mg/dL        Time in Ranges  Very High: >250 mg/dL --- 1%  High: 181 - 250 mg/dL --- 15%  Target Range: 70 - 180 mg/dL --- 84%  Low: 54 - 69 mg/dL --- 0%  Very Low: <54 mg/dL --- 0%      Continuous Glucose Monitoring Interpretation    Sujatha Workman has undergone continuous glucose monitoring with the personal Freestyle perfecto 3 continuous glucose monitor. The blood glucose tracings were evaluated for two weeks prior to office visit. Her blood glucose tracings demonstrated overall well controlled glucose pattern with occasional postprandial hyperglycemia. She did not experience hypoglycemia during the week of evaluation.     HISTORY OF DIABETES COMPLICATIONS:  History of Retinopathy: denies  - last eye exam  within the last 12 months: yes - 2/2025 [facility]   History of Neuropathy: denies   History of Nephropathy: denies     ASSOCIATED COMPLICATIONS:   HTN: denies   Hyperlipidemia: yes  Cardiovascular Disease: denies   Peripheral Vascular Disease: denies     DIETARY COMPLIANCE:  Good; limiting carbs   - denies any dietary changes recently   - was fasting for 1 month during easter holiday   - continues to work on increasing protein with each meal     EXERCISE:   No - staying active with kids    Twice weekly in the office    Polyuria, polyphagia, polydipsia: denies   Paresthesias: denies   Blurred vision: denies   Recent steroids, illness or infections: denies     REVIEW OF SYSTEMS  Constitutional: Negative for: weight change, fever, fatigue, cold/heat intolerance  Eyes: Negative for:  Visual changes, proptosis, blurring  ENT: Negative for:  dysphagia, neck swelling, dysphonia  Respiratory: Negative for: hemoptysis, shortness of breath, cough, or dyspnea.  Cardiovascular: Negative for:  chest pain, chest discomfort, palpitations  GI: Negative for:  abdominal pain, nausea, vomiting, diarrhea, heartburn, constipation  Neurology: Negative for: headache, dizziness, syncope, numbness/tingling, or weakness.   Genito-Urinary: Negative for: dysuria, frequency or hematuria   Hematology/Lymphatics: Negative for: bruising, easy bleeding, lower extremity edema  Skin: Negative for: rash, blister, infection or ulcers.  Endocrine: Negative for: polyuria, polydipsia. no osteoporosis. no thyroid disease.     MEDICATIONS:     Current Outpatient Medications:     TRULICITY 1.5 MG/0.5ML Subcutaneous Solution Auto-injector, ADMINISTER 1.5 MG UNDER THE SKIN 1 TIME A WEEK, Disp: 6 mL, Rfl: 0    ergocalciferol 1.25 MG (96617 UT) Oral Cap, Take 1 capsule (50,000 Units total) by mouth once a week., Disp: 12 capsule, Rfl: 0    Continuous Glucose Sensor (FREESTYLE SKYE 3 PLUS SENSOR) Does not apply Misc, 1 each  every 15 (fifteen) days., Disp: 6 each, Rfl: 0    atorvastatin 20 MG Oral Tab, Take 1 tablet (20 mg total) by mouth daily., Disp: , Rfl:     diazePAM 5 MG Oral Tab, Take 1 tablet (5 mg total) by mouth every 12 (twelve) hours as needed (spasms)., Disp: 10 tablet, Rfl: 0    ALLERGIES:   Allergies[1]    SOCIAL HISTORY:   Social History     Socioeconomic History    Marital status:    Tobacco Use    Smoking status: Never    Smokeless tobacco: Never   Vaping Use    Vaping status: Never Used   Substance and Sexual Activity    Alcohol use: Never    Drug use: Never       PAST MEDICAL HISTORY:   Past Medical History:    Diabetes (HCC)    Hyperlipidemia       PAST SURGICAL HISTORY:   No past surgical history on file.    PHYSICAL EXAM:   Vitals:    05/12/25 1133   BP: 110/74   BP Location: Right arm   Pulse: 89   Resp: 18   Weight: 150 lb (68 kg)   Height: 5' 1\" (1.549 m)     BMI:   Body mass index is 28.34 kg/m².    General Appearance:  alert, well developed, in no acute distress  Nutritional:  no extreme weight gain or loss  Head: Atraumatic  Eyes:  normal conjunctivae, sclera., normal sclera and normal pupils  Throat/Neck: normal sound to voice. Normal hearing, normal speech  Back: no kyphosis  Respiratory:  Speaking in full sentences, non-labored. no increased work of breathing, no audible wheezing    Skin:  normal moisture and skin texture, no visible lesions  Hair and nails: normal scalp hair  Hematologic:  no excessive bruising  Neuro: motor grossly intact, moving all extremities without difficulty  Psychiatric:  oriented to time, self, and place  Extremities: no obvious extremity swelling, no lesions    LABS: Pertinent labs reviewed    ASSESSMENT/PLAN:    -Reviewed with patient the pathogenesis of diabetes, clinical significance of A1c, and common complications such as: microvascular, macrovascular and diabetic ketoacidosis. Patient verbalizes understanding of the importance of glycemic control and the goals of  therapy.   -Discussed with patient glucose targets ranges (Fasting  and post prandial <180).     1.Type 2 Diabetes Mellitus, controlled  -LAB DATA  HbA1C: 6.5% today   a) Medications  - increase Trulicity 1.5--> 3mg once weekly - Risks and benefits reviewed. Verbalizes understanding.  - hold Metformin   Take 1,000mg with high carb meal only as needed     - cont. to limit carbs to 30-45gm per meal/ max 135gm per day.   - discussed to include protein with all meals.   -instructed to start exercising at least 2-3x weekly   -reviewed target goal BG readings and A1C  -reviewed when to call and notify me of abnormal BG readings.     b) Nephropathy: GFR: >90 on 2024 and urine MA: 60.9 on 2/10/2025--> repeat urine MA  c) UTD with optho - last exam was 2025  d) Foot exam: normal on 2/10/2025  e) cont. Using freestyle perfecto 3 plus cgm  f) Life style changes reviewed     2. Blood Pressure Management   - normotensive today     3.Hyperlipidemia   - LDL:71 and Tri on 2023  - on atorvastatin 20mg nightly     4. Vitamin D deficiency   - completed ergocalciferol 50,000 units weekly   - start vitamin D3 2,000 units daily   - will repeat vitamin d lab again before next f/u visit     RTC in 3 months   Patient instructed to call sooner if they develop Blood glucose readings <75 and/or if they have readings persistently >200.     The risks and benefits of my recommendations, as well as other treatment options were discussed with the patient today. questions were also answered to the best of my knowledge. Patient verbalizes understanding of these issues and agrees to the plan.    2025  OSVALDO Hawkins       [1] No Known Allergies

## 2025-05-15 ENCOUNTER — TELEPHONE (OUTPATIENT)
Dept: ENDOCRINOLOGY CLINIC | Facility: CLINIC | Age: 45
End: 2025-05-15

## 2025-05-15 NOTE — TELEPHONE ENCOUNTER
Received a fax of patients most recent eye exam dated on 02/28/2025 with Aric Celaya OD at [facility]. Eye exam was documented in patient's 'Diabetes Flowsheet' and placed in providers folder for review.

## 2025-06-27 RX ORDER — HYDROCHLOROTHIAZIDE 12.5 MG/1
1 CAPSULE ORAL
Qty: 6 EACH | Refills: 1 | Status: SHIPPED | OUTPATIENT
Start: 2025-06-27

## 2025-06-27 NOTE — TELEPHONE ENCOUNTER
[facility] is [facility]          Current Outpatient Medications:       Continuous Glucose Sensor (FREESTYLE SKYE 3 PLUS SENSOR) Does not apply Misc, 1 each every 15 (fifteen) days., Disp: 6 each, Rfl: 0

## 2025-06-27 NOTE — TELEPHONE ENCOUNTER
Endocrine Refill protocol for Glucose testing supplies     Protocol Criteria: PASSED Reason: N/A    If below requirement is met, send a 90-day supply with 1 refill per provider protocol.    Verify appointment with Endocrinology completed in the last 6 months or scheduled in the next 3 months.    Last completed office visit:5/12/2025 Salazar Reinoso APRN   Last completed telemed visit: Visit date not found  Next scheduled Follow up:   Future Appointments   Date Time Provider Department Center   8/20/2025 11:45 AM Salazar Reinoso APRN [facility]

## 2025-07-02 DIAGNOSIS — E11.65 UNCONTROLLED TYPE 2 DIABETES MELLITUS WITH HYPERGLYCEMIA (HCC): ICD-10-CM

## 2025-07-03 RX ORDER — DULAGLUTIDE 3 MG/.5ML
INJECTION, SOLUTION SUBCUTANEOUS
Qty: 6 ML | Refills: 0 | Status: SHIPPED | OUTPATIENT
Start: 2025-07-03

## 2025-07-03 NOTE — TELEPHONE ENCOUNTER
Endocrine Refill protocol for oral and injectable diabetic medications    Protocol Criteria:  PASSED  Reason: N/A    If all below requirements are met, send a 90-day supply with 1 refill per provider protocol.    Verify appointment with Endocrinology completed in the last 6 months or scheduled in the next 3 months.  Verify A1C has been completed within the last 6 months and is below 8.5%     Last completed office visit: 5/12/2025 Salazar Reinoso APRN   Next scheduled Follow up:   Future Appointments   Date Time Provider Department Center   8/20/2025 11:45 AM Salazar Reinoso APRN [facility]      Last A1c result: Last A1c value was 6.5% done 5/12/2025.

## 2025-07-17 ENCOUNTER — TELEPHONE (OUTPATIENT)
Dept: ENDOCRINOLOGY CLINIC | Facility: CLINIC | Age: 45
End: 2025-07-17

## 2025-07-17 NOTE — TELEPHONE ENCOUNTER
Received Fax from [employer] dated 07/17/2025 stating that PA for coverage of TRULICITY 3 MG/0.5ML PEN INJCTR was denied due to not receiving a returned fax questionnaire.    No prior TE recorded receiving the questionnaire. Called [employer] at [phone] and spoke with Lois who confirmed that the questionnaire was faxed to an incorrect number after confirming our Fax number.     Lois stated they would fax the questionnaire to the correct number

## 2025-08-20 ENCOUNTER — OFFICE VISIT (OUTPATIENT)
Dept: ENDOCRINOLOGY CLINIC | Facility: CLINIC | Age: 45
End: 2025-08-20

## 2025-08-20 VITALS
DIASTOLIC BLOOD PRESSURE: 78 MMHG | HEIGHT: 61 IN | WEIGHT: 153 LBS | HEART RATE: 73 BPM | SYSTOLIC BLOOD PRESSURE: 116 MMHG | RESPIRATION RATE: 16 BRPM | BODY MASS INDEX: 28.89 KG/M2

## 2025-08-20 DIAGNOSIS — E11.65 UNCONTROLLED TYPE 2 DIABETES MELLITUS WITH HYPERGLYCEMIA (HCC): Primary | ICD-10-CM

## 2025-08-20 LAB
GLUCOSE BLOOD: 187
HEMOGLOBIN A1C: 7.7 % (ref 4.3–5.6)
TEST STRIP LOT #: NORMAL NUMERIC

## 2025-08-20 PROCEDURE — 95251 CONT GLUC MNTR ANALYSIS I&R: CPT | Performed by: NURSE PRACTITIONER

## 2025-08-20 PROCEDURE — 99214 OFFICE O/P EST MOD 30 MIN: CPT | Performed by: NURSE PRACTITIONER

## 2025-08-20 PROCEDURE — 3074F SYST BP LT 130 MM HG: CPT | Performed by: NURSE PRACTITIONER

## 2025-08-20 PROCEDURE — 3008F BODY MASS INDEX DOCD: CPT | Performed by: NURSE PRACTITIONER

## 2025-08-20 PROCEDURE — 3078F DIAST BP <80 MM HG: CPT | Performed by: NURSE PRACTITIONER

## 2025-08-20 PROCEDURE — 3051F HG A1C>EQUAL 7.0%<8.0%: CPT | Performed by: NURSE PRACTITIONER

## 2025-08-20 PROCEDURE — 82947 ASSAY GLUCOSE BLOOD QUANT: CPT | Performed by: NURSE PRACTITIONER

## 2025-08-20 PROCEDURE — 83036 HEMOGLOBIN GLYCOSYLATED A1C: CPT | Performed by: NURSE PRACTITIONER

## 2025-08-20 RX ORDER — TIRZEPATIDE 5 MG/.5ML
5 INJECTION, SOLUTION SUBCUTANEOUS WEEKLY
Qty: 6 ML | Refills: 0 | Status: SHIPPED | OUTPATIENT
Start: 2025-08-20

## 2025-08-20 RX ORDER — HYDROCHLOROTHIAZIDE 12.5 MG/1
1 CAPSULE ORAL
Qty: 6 EACH | Refills: 1 | Status: SHIPPED | OUTPATIENT
Start: 2025-08-20